# Patient Record
Sex: FEMALE | Race: WHITE | NOT HISPANIC OR LATINO | Employment: UNEMPLOYED | URBAN - METROPOLITAN AREA
[De-identification: names, ages, dates, MRNs, and addresses within clinical notes are randomized per-mention and may not be internally consistent; named-entity substitution may affect disease eponyms.]

---

## 2017-11-08 LAB
HPV, HR, OTHER GENOTYPES: NORMAL
PAP SMEAR: NORMAL

## 2018-10-21 ENCOUNTER — HOSPITAL ENCOUNTER (EMERGENCY)
Facility: HOSPITAL | Age: 28
Discharge: HOME | End: 2018-10-22
Attending: EMERGENCY MEDICINE
Payer: MEDICAID

## 2018-10-21 DIAGNOSIS — R07.0 THROAT PAIN: Primary | ICD-10-CM

## 2018-10-21 DIAGNOSIS — M54.2 NECK PAIN: ICD-10-CM

## 2018-10-21 LAB
B-HCG UR QL: NEGATIVE
CTP QC/QA: YES

## 2018-10-21 PROCEDURE — 81025 URINE PREGNANCY TEST: CPT | Performed by: NURSE PRACTITIONER

## 2018-10-21 PROCEDURE — 99285 EMERGENCY DEPT VISIT HI MDM: CPT

## 2018-10-22 VITALS
SYSTOLIC BLOOD PRESSURE: 116 MMHG | HEART RATE: 92 BPM | HEIGHT: 68 IN | OXYGEN SATURATION: 96 % | BODY MASS INDEX: 23.34 KG/M2 | TEMPERATURE: 98 F | DIASTOLIC BLOOD PRESSURE: 56 MMHG | WEIGHT: 154 LBS | RESPIRATION RATE: 16 BRPM

## 2018-10-22 LAB
ANION GAP SERPL CALC-SCNC: 10 MMOL/L
BUN SERPL-MCNC: 13 MG/DL
CALCIUM SERPL-MCNC: 9.4 MG/DL
CHLORIDE SERPL-SCNC: 105 MMOL/L
CO2 SERPL-SCNC: 26 MMOL/L
CREAT SERPL-MCNC: 0.7 MG/DL
EST. GFR  (AFRICAN AMERICAN): >60 ML/MIN/1.73 M^2
EST. GFR  (NON AFRICAN AMERICAN): >60 ML/MIN/1.73 M^2
GLUCOSE SERPL-MCNC: 95 MG/DL
POTASSIUM SERPL-SCNC: 3.6 MMOL/L
SODIUM SERPL-SCNC: 141 MMOL/L

## 2018-10-22 PROCEDURE — 25500020 PHARM REV CODE 255: Performed by: EMERGENCY MEDICINE

## 2018-10-22 PROCEDURE — 25000003 PHARM REV CODE 250: Performed by: EMERGENCY MEDICINE

## 2018-10-22 PROCEDURE — 80048 BASIC METABOLIC PNL TOTAL CA: CPT

## 2018-10-22 RX ORDER — PREDNISONE 50 MG/1
50 TABLET ORAL DAILY
Qty: 2 TABLET | Refills: 0 | Status: SHIPPED | OUTPATIENT
Start: 2018-10-22 | End: 2018-10-24

## 2018-10-22 RX ADMIN — SODIUM CHLORIDE 1000 ML: 0.9 INJECTION, SOLUTION INTRAVENOUS at 02:10

## 2018-10-22 RX ADMIN — IOHEXOL 75 ML: 350 INJECTION, SOLUTION INTRAVENOUS at 01:10

## 2018-10-22 NOTE — DISCHARGE INSTRUCTIONS
You were seen in the emergency department for throat pain and a feeling of a foreign body.  Your exam, CT scan, are reassuring.  We are not sure the cause is.  We do nothing is immediately dangerous.  Please follow up with an ENT doctor tomorrow.  Please call the number above and schedule an appointment very soon.  Please return for any new or worsening pain, nausea, vomiting, difficulty swallowing, difficulty breathing, or become concerning in any other way.

## 2018-10-22 NOTE — ED TRIAGE NOTES
Pt reports to ED with c/o feeling like something is stuck in her throat, potentially a carrot, from 5 days ago. Pt reports this has happened before and usually it passes. Pt reports it hurts and stops her from sleeping. Denies difficulty breathing or any airway obstruction.

## 2018-10-22 NOTE — ED PROVIDER NOTES
"Encounter Date: 10/21/2018    SCRIBE #1 NOTE: I, Lynda Rocha, am scribing for, and in the presence of,  Soy Mcginnis MD. I have scribed the following portions of the note - Other sections scribed: HPI, ROS, PE.       History     Chief Complaint   Patient presents with    Sore Throat     Patient reports that something is stuck in her throat since eating eating about 5 days ago.     CC: Sore Throat     HPI: This is a 27 y/o female with no PMHx who presents to the ED for emergent evaluation of acute onset throat pain x3 days. Pt rates the pain as severe (3/10) and constant. Pt notes that it feels like something is stuck in her throat. She relates it to a carrot she ate a few days ago. Pt denies appetite change, difficulty swallowing, or SOB. No further symptoms reported.     Of note, pt drinks EtOH occasionally. No smoking cigarettes or illicit drug use.       The history is provided by the patient. No  was used.     Review of patient's allergies indicates:   Allergen Reactions    Ceclor [cefaclor]      History reviewed. No pertinent past medical history.  Past Surgical History:   Procedure Laterality Date    WISDOM TOOTH EXTRACTION       History reviewed. No pertinent family history.  Social History     Tobacco Use    Smoking status: Never Smoker   Substance Use Topics    Alcohol use: Yes     Comment: social    Drug use: No     Review of Systems   Constitutional: Negative for appetite change, chills and fever.   HENT: Positive for sore throat ("something stuck in my throat"). Negative for congestion, ear pain, rhinorrhea and trouble swallowing.    Eyes: Negative for pain and visual disturbance.   Respiratory: Negative for cough and shortness of breath.    Cardiovascular: Negative for chest pain.   Gastrointestinal: Negative for abdominal pain, diarrhea, nausea and vomiting.   Genitourinary: Negative for dysuria.   Musculoskeletal: Negative for back pain and neck pain.   Skin: " Negative for rash.   Neurological: Negative for headaches.   Psychiatric/Behavioral: Negative for confusion.   All other systems reviewed and are negative.      Physical Exam     Initial Vitals [10/21/18 2035]   BP Pulse Resp Temp SpO2   118/72 91 18 98.5 °F (36.9 °C) 99 %      MAP       --         Physical Exam    Nursing note and vitals reviewed.  Constitutional: She appears well-developed and well-nourished. She is not diaphoretic.  Non-toxic appearance. She does not appear ill. No distress.   HENT:   Head: Normocephalic and atraumatic.   Right Ear: External ear normal.   Left Ear: External ear normal.   Nose: Nose normal.   Mouth/Throat: Oropharynx is clear and moist. No oropharyngeal exudate.   Patient is eating and drinking normally.   Eyes: Conjunctivae and EOM are normal. Pupils are equal, round, and reactive to light. Right eye exhibits no discharge. Left eye exhibits no discharge.   Neck: Normal range of motion. Neck supple. No thyromegaly present. No stridor present. No tracheal deviation present. No JVD present.   Cardiovascular: Normal rate, regular rhythm, normal heart sounds and intact distal pulses. Exam reveals no gallop and no friction rub.    No murmur heard.  Pulmonary/Chest: Effort normal and breath sounds normal. No stridor. No respiratory distress. She has no wheezes. She has no rhonchi. She has no rales. She exhibits no tenderness.   Abdominal: Soft. Normal appearance and bowel sounds are normal. She exhibits no distension. There is no tenderness. There is no rebound and no guarding.   Musculoskeletal: Normal range of motion. She exhibits no edema.   Lymphadenopathy:     She has no cervical adenopathy.   Neurological: She is alert and oriented to person, place, and time. She has normal strength. No cranial nerve deficit.   Skin: Skin is warm and dry.   Psychiatric: She has a normal mood and affect. Her behavior is normal.         ED Course   Procedures  Labs Reviewed   BASIC METABOLIC PANEL    POCT URINE PREGNANCY          Imaging Results          CT Soft Tissue Neck With Contrast (Final result)  Result time 10/22/18 01:53:59    Final result by Zen Spencer MD (10/22/18 01:53:59)                 Impression:      No evidence of radiopaque foreign body in the neck.    No inflammatory changes in the region of epiglottis or anywhere else in the neck.    Nonspecific prominent lymph nodes in the cervical chain distribution.  These are probably reactive in nature.      Electronically signed by: Zen Spencer MD  Date:    10/22/2018  Time:    01:53             Narrative:    EXAMINATION:  CT SOFT TISSUE NECK WITH CONTRAST    CLINICAL HISTORY:  pain in throat, hazy epiglottis on xray;    TECHNIQUE:  Low dose axial images as well as sagittal and coronal reconstructions were performed from the skull base to the clavicles following the intravenous administration of 75 mL of Omnipaque 350.    COMPARISON:  X-rays of the soft tissue neck dated 10/21/2018.    FINDINGS:  The visualized portions of the intracranial compartment is within normal limits.  The orbits and intraorbital contents are unremarkable.  The paranasal sinuses and mastoid air cells are clear.    There is straightening of the nasal septum.  The nasal cavity is otherwise unremarkable.  The oral cavity is within normal limits.  The oropharynx and nasopharynx are within normal limits.  The hypopharynx is unremarkable.  The larynx is within normal limits.  No retropharyngeal effusion is present.  No radiopaque foreign body is identified.  No inflammatory changes identified in the region of the epiglottis.  The pre epiglottic fat is preserved.    The parotid and submandibular glands are within normal limits.  The thyroid gland is unremarkable.  There are multiple subcentimeter internal jugular chain lymph nodes.  These are not enlarged by size criteria.    The trachea is unremarkable.  The visualized portions of the airways are within normal limits.  The lung  apices are unremarkable.  The cervical spine is unremarkable.    There is normal intravascular enhancement.                               X-Ray Neck Soft Tissue (Final result)  Result time 10/21/18 22:29:10    Final result by Zen Spencer MD (10/21/18 22:29:10)                 Impression:      Poor definition of the paulo-epiglottic region.  No soft tissue gas or radiopaque foreign body.  Additional evaluation, as clinically warranted.      Electronically signed by: Zen Spencer MD  Date:    10/21/2018  Time:    22:29             Narrative:    EXAMINATION:  XR NECK SOFT TISSUE    CLINICAL HISTORY:  Cervicalgia    TECHNIQUE:  AP and lateral soft tissue views the neck were performed.    COMPARISON:  None.    FINDINGS:  The craniocervical junction is within normal limits.  The cervical alignment is unremarkable.  No prevertebral soft tissue swelling is identified.    There is poor definition of the paulo-epiglottic region.  The remaining soft tissues of the neck are within normal limits.  No abnormal calcifications are identified.  No radiopaque density is identified.  No abnormal soft tissue gas is identified.                                 Medical Decision Making:   Initial Assessment:   28-year-old female with no significant past medical history presenting with foreign body sensation in her throat.  Patient states she thinks she may have gotten a carrot stuck in her throat about 4 days ago.  Since then, pain has not worsened but has not improved.  She denies any difficulty breathing, difficulty tolerating secretions, difficulty swallowing.  The pain is constant and aggravating causing her to have a hard time falling asleep.  On exam, she is well-appearing in no acute distress.  No stridor, difficulty swallowing, no obvious deformities on palpation of her throat.  Visual inspection of her oral pharynx does not show any abnormalities.  ED Management:  X-ray soft tissue neck reveals haziness around her epiglottis.  CT  soft tissue neck reveals no acute findings.  Patient otherwise well-appearing.  Discussed need to follow up with ENT.  Otherwise safe for discharge home.  No evidence of impending airway difficulty, obstruction, drooling, difficulty swallowing, difficulty breathing, or other concerns.            Scribe Attestation:   Scribe #1: I performed the above scribed service and the documentation accurately describes the services I performed. I attest to the accuracy of the note.    Attending Attestation:           Physician Attestation for Scribe:  Physician Attestation Statement for Scribe #1: I, Soy Mcginnis MD, reviewed documentation, as scribed by Lynda Rocha in my presence, and it is both accurate and complete.                    Clinical Impression:   The primary encounter diagnosis was Throat pain. A diagnosis of Neck pain was also pertinent to this visit.      Disposition:   Disposition: Discharged  Condition: Stable                        Soy Mcginnis MD  10/25/18 0308

## 2018-10-22 NOTE — ED NOTES
Vannesa with lab called and informed that a BMP was being sent in the tube system and a request was made to have it run as soon as it was received. CT is delayed due to awaiting a creatinine level.

## 2018-10-23 ENCOUNTER — PES CALL (OUTPATIENT)
Dept: ADMINISTRATIVE | Facility: CLINIC | Age: 28
End: 2018-10-23

## 2019-07-28 NOTE — PROGRESS NOTES
This note was created by combination of typed  and Dragon dictation.  Transcription errors may be present.  If there are any questions, please contact me.    Assessment / Plan:   Normal physical exam  -return for fasting labs  Gyn - pap done 2017 due 2020  -     CBC auto differential; Future; Expected date: 07/29/2019  -     Comprehensive metabolic panel; Future; Expected date: 07/29/2019  -     Lipid panel; Future; Expected date: 07/29/2019  -     TSH; Future; Expected date: 07/29/2019    Need for Tdap vaccination  -     (In Office Administered) Tdap Vaccine    Tinea corporis  -only using topical antifungal x 1 week.  Continue for 2-4 weeks before considering escalating to oral antifungal    There are no discontinued medications.    meds sent this encounter:       Follow Up: No follow-ups on file. if all normal PEx 3 years.      Subjective:     Chief Complaint   Patient presents with    Annual Exam    Establish Care       HPI  Mireya is a 29 y.o. female, last appointment with this clinic was Visit date not found.    No LMP recorded.    New patient  Gyn.  Sees midwife over at [facility]    10/22/2018 BMP normal    Thinks she has ringworm.  Using OTC topical antifungal without relief. LMP 7/23, just finishing up.  Not pregnant but is considering pregnancy in the future; no sexual activity since menses. Just finished nursing 2 year old son.     Thinks she has ringworm. On the hips laterally and the lower back. Thinks started after hot camping trip. X weeks. No itching. No bleeding. Seeing more spots. Topical antifungal tolnaftate for the past week.     Thinks she needs Td injection.  2 year old child.  Did get Rhogam with last child. But cannot recall Tdap. No immunizations recently in registry.    Patient Care Team:  Primary Doctor No as PCP - General    There are no active problems to display for this patient.      PAST MEDICAL HISTORY:  Past Medical History:   Diagnosis Date    Anxiety      improved with TLC. hx of fluoxetine       PAST SURGICAL HISTORY:  Past Surgical History:   Procedure Laterality Date    WISDOM TOOTH EXTRACTION       Family History   Problem Relation Age of Onset    No Known Problems Son     No Known Problems Daughter     No Known Problems Daughter     Depression Mother     Anxiety disorder Father     Tourette syndrome Sister     No Known Problems Brother     No Known Problems Brother     No Known Problems Brother     No Known Problems Brother     No Known Problems Brother     No Known Problems Brother     No Known Problems Sister        SOCIAL HISTORY:  Social History     Socioeconomic History    Marital status:      Spouse name: Not on file    Number of children: 2    Years of education: Not on file    Highest education level: Not on file   Occupational History    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   Social Needs    Financial resource strain: Not on file    Food insecurity:     Worry: Not on file     Inability: Not on file    Transportation needs:     Medical: Not on file     Non-medical: Not on file   Tobacco Use    Smoking status: Never Smoker    Smokeless tobacco: Never Used   Substance and Sexual Activity    Alcohol use: Yes     Comment: social    Drug use: No    Sexual activity: Yes     Partners: Male   Lifestyle    Physical activity:     Days per week: Not on file     Minutes per session: Not on file    Stress: Not on file   Relationships    Social connections:     Talks on phone: Not on file     Gets together: Not on file     Attends [employer] service: Not on file     Active member of club or organization: Not on file     Attends meetings of clubs or organizations: Not on file     Relationship status: Not on file   Other Topics Concern    Not on file   Social History Narrative    Not on file        ALLERGIES AND MEDICATIONS: updated and reviewed.  Review of patient's allergies indicates:   Allergen Reactions    Ceclor [cefaclor]      No  "current outpatient medications on file.     No current facility-administered medications for this visit.        Review of Systems   Constitutional: Negative for fever, malaise/fatigue and weight loss.   HENT: Negative for congestion.    Eyes: Negative for blurred vision and pain.   Respiratory: Negative for shortness of breath and wheezing.    Cardiovascular: Negative for chest pain, palpitations and leg swelling.   Gastrointestinal: Negative for abdominal pain, blood in stool, constipation, diarrhea and heartburn.   Genitourinary: Negative for dysuria, hematuria and urgency.   Musculoskeletal: Negative for joint pain.   Skin: Positive for rash.   Neurological: Negative for tingling, focal weakness, weakness and headaches.       Objective:   Physical Exam   Vitals:    07/29/19 1017   BP: 104/68   BP Location: Right arm   Patient Position: Sitting   BP Method: Medium (Manual)   Pulse: 99   Temp: 98.2 °F (36.8 °C)   TempSrc: Oral   SpO2: 98%   Weight: 70.8 kg (156 lb)   Height: 5' 8" (1.727 m)    Body mass index is 23.72 kg/m².            Physical Exam   Constitutional: She is oriented to person, place, and time. She appears well-developed and well-nourished.   HENT:   Right Ear: Tympanic membrane, external ear and ear canal normal.   Left Ear: Tympanic membrane, external ear and ear canal normal.   Mouth/Throat: Oropharynx is clear and moist.   Eyes: Pupils are equal, round, and reactive to light. EOM are normal. No scleral icterus.   Neck: Neck supple. No thyromegaly present.   Cardiovascular: Normal rate, regular rhythm and normal heart sounds.   No murmur heard.  Pulmonary/Chest: Effort normal and breath sounds normal. She has no wheezes.   Abdominal: Soft. She exhibits no mass. There is no splenomegaly or hepatomegaly. There is no tenderness.   Musculoskeletal: Normal range of motion. She exhibits no edema or deformity.   Lymphadenopathy:     She has no cervical adenopathy.   Neurological: She is alert and " oriented to person, place, and time. She has normal reflexes.   Skin: Skin is warm and dry. Rash noted.   Small of lumbar back with about 3 cm patch with active outer edge, no obvious serious scale no induration no erythema in the center. The outer edge with small ring raised  Similar morphology lesion on the left lateral hip area.   Psychiatric: She has a normal mood and affect. Her behavior is normal. Judgment and thought content normal.

## 2019-07-29 ENCOUNTER — TELEPHONE (OUTPATIENT)
Dept: ADMINISTRATIVE | Facility: HOSPITAL | Age: 29
End: 2019-07-29

## 2019-07-29 ENCOUNTER — OFFICE VISIT (OUTPATIENT)
Dept: FAMILY MEDICINE | Facility: CLINIC | Age: 29
End: 2019-07-29
Payer: COMMERCIAL

## 2019-07-29 VITALS
HEART RATE: 99 BPM | TEMPERATURE: 98 F | OXYGEN SATURATION: 98 % | DIASTOLIC BLOOD PRESSURE: 68 MMHG | WEIGHT: 156 LBS | SYSTOLIC BLOOD PRESSURE: 104 MMHG | BODY MASS INDEX: 23.64 KG/M2 | HEIGHT: 68 IN

## 2019-07-29 DIAGNOSIS — Z00.00 NORMAL PHYSICAL EXAM: Primary | ICD-10-CM

## 2019-07-29 DIAGNOSIS — B35.4 TINEA CORPORIS: ICD-10-CM

## 2019-07-29 DIAGNOSIS — Z23 NEED FOR TDAP VACCINATION: ICD-10-CM

## 2019-07-29 PROCEDURE — 99999 PR PBB SHADOW E&M-EST. PATIENT-LVL III: CPT | Mod: PBBFAC,,, | Performed by: INTERNAL MEDICINE

## 2019-07-29 PROCEDURE — 90471 IMMUNIZATION ADMIN: CPT | Mod: S$GLB,,, | Performed by: INTERNAL MEDICINE

## 2019-07-29 PROCEDURE — 90715 TDAP VACCINE 7 YRS/> IM: CPT | Mod: S$GLB,,, | Performed by: INTERNAL MEDICINE

## 2019-07-29 PROCEDURE — 99395 PR PREVENTIVE VISIT,EST,18-39: ICD-10-PCS | Mod: 25,S$GLB,, | Performed by: INTERNAL MEDICINE

## 2019-07-29 PROCEDURE — 90715 TDAP VACCINE GREATER THAN OR EQUAL TO 7YO IM: ICD-10-PCS | Mod: S$GLB,,, | Performed by: INTERNAL MEDICINE

## 2019-07-29 PROCEDURE — 90471 TDAP VACCINE GREATER THAN OR EQUAL TO 7YO IM: ICD-10-PCS | Mod: S$GLB,,, | Performed by: INTERNAL MEDICINE

## 2019-07-29 PROCEDURE — 99395 PREV VISIT EST AGE 18-39: CPT | Mod: 25,S$GLB,, | Performed by: INTERNAL MEDICINE

## 2019-07-29 PROCEDURE — 99999 PR PBB SHADOW E&M-EST. PATIENT-LVL III: ICD-10-PCS | Mod: PBBFAC,,, | Performed by: INTERNAL MEDICINE

## 2020-08-14 DIAGNOSIS — Z11.59 NEED FOR HEPATITIS C SCREENING TEST: ICD-10-CM

## 2020-10-05 ENCOUNTER — PATIENT MESSAGE (OUTPATIENT)
Dept: ADMINISTRATIVE | Facility: HOSPITAL | Age: 30
End: 2020-10-05

## 2021-01-04 ENCOUNTER — PATIENT MESSAGE (OUTPATIENT)
Dept: ADMINISTRATIVE | Facility: HOSPITAL | Age: 31
End: 2021-01-04

## 2021-04-06 ENCOUNTER — PATIENT MESSAGE (OUTPATIENT)
Dept: ADMINISTRATIVE | Facility: HOSPITAL | Age: 31
End: 2021-04-06

## 2021-04-16 ENCOUNTER — PATIENT MESSAGE (OUTPATIENT)
Dept: RESEARCH | Facility: HOSPITAL | Age: 31
End: 2021-04-16

## 2021-07-07 ENCOUNTER — PATIENT MESSAGE (OUTPATIENT)
Dept: ADMINISTRATIVE | Facility: HOSPITAL | Age: 31
End: 2021-07-07

## 2021-10-04 ENCOUNTER — PATIENT MESSAGE (OUTPATIENT)
Dept: ADMINISTRATIVE | Facility: HOSPITAL | Age: 31
End: 2021-10-04

## 2022-05-31 ENCOUNTER — PATIENT MESSAGE (OUTPATIENT)
Dept: ADMINISTRATIVE | Facility: HOSPITAL | Age: 32
End: 2022-05-31
Payer: COMMERCIAL

## 2025-01-13 ENCOUNTER — OFFICE VISIT (OUTPATIENT)
Dept: URGENT CARE | Facility: CLINIC | Age: 35
End: 2025-01-13
Payer: MEDICAID

## 2025-01-13 VITALS
SYSTOLIC BLOOD PRESSURE: 116 MMHG | WEIGHT: 156 LBS | HEIGHT: 68 IN | HEART RATE: 95 BPM | OXYGEN SATURATION: 98 % | DIASTOLIC BLOOD PRESSURE: 76 MMHG | BODY MASS INDEX: 23.64 KG/M2 | RESPIRATION RATE: 20 BRPM | TEMPERATURE: 98 F

## 2025-01-13 DIAGNOSIS — B96.89 ACUTE BACTERIAL SINUSITIS: Primary | ICD-10-CM

## 2025-01-13 DIAGNOSIS — J01.90 ACUTE BACTERIAL SINUSITIS: Primary | ICD-10-CM

## 2025-01-13 DIAGNOSIS — R05.9 COUGH, UNSPECIFIED TYPE: ICD-10-CM

## 2025-01-13 PROCEDURE — 99203 OFFICE O/P NEW LOW 30 MIN: CPT | Mod: S$GLB,,, | Performed by: NURSE PRACTITIONER

## 2025-01-13 RX ORDER — AZITHROMYCIN 250 MG/1
TABLET, FILM COATED ORAL
Qty: 6 TABLET | Refills: 0 | Status: SHIPPED | OUTPATIENT
Start: 2025-01-13 | End: 2025-01-18

## 2025-01-13 NOTE — PROGRESS NOTES
"Subjective:      Patient ID: Mireya Orta is a 34 y.o. female.    Vitals:  height is 5' 8" (1.727 m) and weight is 70.8 kg (156 lb). Her oral temperature is 98 °F (36.7 °C). Her blood pressure is 116/76 and her pulse is 95. Her respiration is 20 and oxygen saturation is 98%.     Chief Complaint: Cough (I have been sick for 17 days and fear I may have a sinus infection. My whole family had influenza at the onset of this I believe. - Entered by patient)    Pt is a 34 y.o. female with the complaint of Cough, stuffy nose, headache, fatigue, yellow/green mucus. Has been sick for 17+ days. Pt was also exposed to the flu in the beginning of her illness. She is currently 23 weeks pregnant.     Cough  This is a new problem. The current episode started 1 to 4 weeks ago. The problem has been unchanged. The cough is Non-productive. Associated symptoms include headaches and nasal congestion. Pertinent negatives include no chest pain, chills, ear congestion, ear pain, fever, heartburn, hemoptysis, myalgias, postnasal drip, rash, rhinorrhea, sore throat, shortness of breath, sweats, weight loss or wheezing. There is no history of asthma, bronchiectasis, bronchitis, COPD, emphysema, environmental allergies or pneumonia.     Constitution: Negative for chills and fever.   HENT:  Negative for ear pain, postnasal drip and sore throat.    Cardiovascular:  Negative for chest pain.   Respiratory:  Positive for cough. Negative for bloody sputum, shortness of breath and wheezing.    Gastrointestinal:  Negative for heartburn.   Musculoskeletal:  Negative for muscle ache.   Skin:  Negative for rash.   Allergic/Immunologic: Negative for environmental allergies.   Neurological:  Positive for headaches.      Objective:     Physical Exam   Constitutional: She is oriented to person, place, and time. She appears well-developed. She is cooperative.  Non-toxic appearance. She does not appear ill. No distress.   HENT:   Head: Normocephalic and " atraumatic.   Ears:   Right Ear: Hearing, tympanic membrane, external ear and ear canal normal.   Left Ear: Hearing, tympanic membrane, external ear and ear canal normal.   Nose: Nose normal. No mucosal edema, rhinorrhea or nasal deformity. No epistaxis. Right sinus exhibits no maxillary sinus tenderness and no frontal sinus tenderness. Left sinus exhibits no maxillary sinus tenderness and no frontal sinus tenderness.   Mouth/Throat: Uvula is midline, oropharynx is clear and moist and mucous membranes are normal. No trismus in the jaw. Normal dentition. No uvula swelling. No oropharyngeal exudate, posterior oropharyngeal edema or posterior oropharyngeal erythema.   Eyes: Conjunctivae and lids are normal. No scleral icterus.   Neck: Trachea normal and phonation normal. Neck supple. No edema present. No erythema present. No neck rigidity present.   Cardiovascular: Normal rate, regular rhythm, normal heart sounds and normal pulses.   Pulmonary/Chest: Effort normal and breath sounds normal. No respiratory distress. She has no decreased breath sounds. She has no wheezes. She has no rhonchi.   cough         Comments: cough    Abdominal: Normal appearance.   Musculoskeletal: Normal range of motion.         General: No deformity. Normal range of motion.   Neurological: She is alert and oriented to person, place, and time. She exhibits normal muscle tone. Coordination normal.   Skin: Skin is warm, dry, intact, not diaphoretic and not pale.   Psychiatric: Her speech is normal and behavior is normal. Judgment and thought content normal.   Nursing note and vitals reviewed.    Assessment:     1. Acute bacterial sinusitis    2. Cough, unspecified type        Plan:       Acute bacterial sinusitis  -     azithromycin (Z-SUNG) 250 MG tablet; Take 2 tablets by mouth on day 1; Take 1 tablet by mouth on days 2-5  Dispense: 6 tablet; Refill: 0    Cough, unspecified type      Patient Instructions   - You must understand that you have  received an Urgent Care treatment only and that you may be released before all of your medical problems are known or treated.   - You, the patient, will arrange for follow up care as instructed.   - If your condition worsens or fails to improve we recommend that you receive another evaluation at the ER immediately or contact your PCP to discuss your concerns.   - You can call [phone] or [phone] to help schedule an appointment with the appropriate provider.    Drink plenty of fluids   Get lots of rest  Tylenol or ibuprofen for pain/fever  Mucinex DM for cough  Saline nasal rinses to irrigate sinus cavities  Warm salt water gargles for sore throat

## 2025-01-13 NOTE — PATIENT INSTRUCTIONS
- You must understand that you have received an Urgent Care treatment only and that you may be released before all of your medical problems are known or treated.   - You, the patient, will arrange for follow up care as instructed.   - If your condition worsens or fails to improve we recommend that you receive another evaluation at the ER immediately or contact your PCP to discuss your concerns.   - You can call [phone] or [phone] to help schedule an appointment with the appropriate provider.    Drink plenty of fluids   Get lots of rest  Tylenol or ibuprofen for pain/fever  Mucinex DM for cough  Saline nasal rinses to irrigate sinus cavities  Warm salt water gargles for sore throat